# Patient Record
Sex: MALE | Race: WHITE
[De-identification: names, ages, dates, MRNs, and addresses within clinical notes are randomized per-mention and may not be internally consistent; named-entity substitution may affect disease eponyms.]

---

## 2017-05-16 ENCOUNTER — HOSPITAL ENCOUNTER (OUTPATIENT)
Dept: HOSPITAL 45 - C.LABBC | Age: 67
Discharge: HOME | End: 2017-05-16
Attending: UROLOGY
Payer: COMMERCIAL

## 2017-05-16 DIAGNOSIS — R97.20: Primary | ICD-10-CM

## 2017-06-08 ENCOUNTER — HOSPITAL ENCOUNTER (OUTPATIENT)
Dept: HOSPITAL 45 - C.LABBC | Age: 67
Discharge: HOME | End: 2017-06-08
Attending: UROLOGY
Payer: COMMERCIAL

## 2017-06-08 DIAGNOSIS — E11.9: ICD-10-CM

## 2017-06-08 DIAGNOSIS — E78.00: ICD-10-CM

## 2017-06-08 DIAGNOSIS — N41.1: Primary | ICD-10-CM

## 2017-06-08 LAB
ANION GAP SERPL CALC-SCNC: 7 MMOL/L (ref 3–11)
BUN SERPL-MCNC: 13 MG/DL (ref 7–18)
BUN/CREAT SERPL: 9.4 (ref 10–20)
CALCIUM SERPL-MCNC: 8.8 MG/DL (ref 8.5–10.1)
CHLORIDE SERPL-SCNC: 106 MMOL/L (ref 98–107)
CO2 SERPL-SCNC: 28 MMOL/L (ref 21–32)
CREAT SERPL-MCNC: 1.4 MG/DL (ref 0.6–1.4)
GLUCOSE SERPL-MCNC: 156 MG/DL (ref 70–99)
POTASSIUM SERPL-SCNC: 4.4 MMOL/L (ref 3.5–5.1)
SODIUM SERPL-SCNC: 141 MMOL/L (ref 136–145)

## 2017-07-18 ENCOUNTER — HOSPITAL ENCOUNTER (OUTPATIENT)
Dept: HOSPITAL 45 - C.LABBC | Age: 67
Discharge: HOME | End: 2017-07-18
Attending: INTERNAL MEDICINE
Payer: COMMERCIAL

## 2017-07-18 DIAGNOSIS — E78.00: Primary | ICD-10-CM

## 2017-07-18 DIAGNOSIS — E53.8: ICD-10-CM

## 2017-07-18 DIAGNOSIS — E11.9: ICD-10-CM

## 2017-07-18 LAB
ALT SERPL-CCNC: 28 U/L (ref 12–78)
ANION GAP SERPL CALC-SCNC: 5 MMOL/L (ref 3–11)
AST SERPL-CCNC: 15 U/L (ref 15–37)
BUN SERPL-MCNC: 12 MG/DL (ref 7–18)
BUN/CREAT SERPL: 10.6 (ref 10–20)
CALCIUM SERPL-MCNC: 8.9 MG/DL (ref 8.5–10.1)
CHLORIDE SERPL-SCNC: 107 MMOL/L (ref 98–107)
CHOLEST/HDLC SERPL: 4.2 {RATIO}
CO2 SERPL-SCNC: 29 MMOL/L (ref 21–32)
CREAT SERPL-MCNC: 1.1 MG/DL (ref 0.6–1.4)
GLUCOSE SERPL-MCNC: 124 MG/DL (ref 70–99)
GLUCOSE UR QL: 35 MG/DL
KETONES UR QL STRIP: 93 MG/DL
NITRITE UR QL STRIP: 99 MG/DL (ref 0–150)
PH UR: 148 MG/DL (ref 0–200)
POTASSIUM SERPL-SCNC: 4.1 MMOL/L (ref 3.5–5.1)
SODIUM SERPL-SCNC: 141 MMOL/L (ref 136–145)
VERY LOW DENSITY LIPOPROT CALC: 20 MG/DL

## 2017-07-19 LAB — EST. AVERAGE GLUCOSE BLD GHB EST-MCNC: 126 MG/DL

## 2017-07-26 NOTE — CODING QUERY MEDICAL NECESSITY
CQSUPPORTING DIAGNOSIS NEEDED





A supporting diagnosis is required for the test/procedure performed on this patient in 
order for us to be reimbursed by the patient's insurance. Please provide a supporting 
diagnosis for the following test/procedure listed below next to the test name along with 
your signature. 



*If there is no additional diagnosis for this patient that would support the following 
test/procedure please document that below next to the test/procedure.



Test(s)/Procedure(s) that require a supporting diagnosis:



DOS   07/18/17    VITAMIN D TEST







Provider Signature:  ______________________________  Date:  _______



Thank you  

Paige Farmer

Health Information Management

Phone:  [phone]

Fax:  [phone]



Once completed, please kindly fax back to [phone]



For questions please call [phone]

## 2017-08-02 ENCOUNTER — HOSPITAL ENCOUNTER (OUTPATIENT)
Dept: HOSPITAL 45 - C.LABBC | Age: 67
Discharge: HOME | End: 2017-08-02
Attending: UROLOGY
Payer: COMMERCIAL

## 2017-08-02 DIAGNOSIS — R97.20: Primary | ICD-10-CM

## 2017-08-02 LAB
PSA FREE MFR SERPL: 29.5 %
PSA FREE SERPL-MCNC: 2.25 NG/ML
PSA SERPL-MCNC: 7.62 NG/ML (ref 0–4)

## 2017-09-19 ENCOUNTER — HOSPITAL ENCOUNTER (OUTPATIENT)
Dept: HOSPITAL 45 - C.LABBC | Age: 67
Discharge: HOME | End: 2017-09-19
Attending: UROLOGY
Payer: COMMERCIAL

## 2017-09-19 DIAGNOSIS — R97.20: Primary | ICD-10-CM

## 2017-09-19 LAB
BUN SERPL-MCNC: 9 MG/DL (ref 7–18)
BUN/CREAT SERPL: 7.9 (ref 10–20)
CREAT SERPL-MCNC: 1.2 MG/DL (ref 0.6–1.4)

## 2017-09-21 ENCOUNTER — HOSPITAL ENCOUNTER (OUTPATIENT)
Dept: HOSPITAL 45 - C.MRIBC | Age: 67
Discharge: HOME | End: 2017-09-21
Attending: UROLOGY
Payer: COMMERCIAL

## 2017-09-21 DIAGNOSIS — R97.20: Primary | ICD-10-CM

## 2017-09-21 DIAGNOSIS — N40.0: ICD-10-CM

## 2017-09-22 NOTE — DIAGNOSTIC IMAGING REPORT
PROSTATE MRI COMBO



CLINICAL HISTORY: 67 years-old Male presenting with ELEVATED PSA. PSA 7.62

ng/mL. 



TECHNIQUE: Multisequence, multiplanar MR imaging of the prostate was performed

before and after the administration of intravenous contrast. IV contrast: 14 mL

of Gadavist. 



COMPARISON: None.



FINDINGS:



Prostate: The prostate measures 5.9 x 5.4 x 4.5 cm (calculated volume 75 mL).

Moderate changes of benign prostatic hyperplasia. Precontrast T1 weighted

imaging demonstrates no evidence of intrinsic T1 hyperintensity to suggest

hemorrhage. Seminal vesicles normal. No suspicious lesion in the transition or

peripheral zones.



Bladder: Normal.



Bowel: Internal hemorrhoids suspected.



Lymph nodes: No lymphadenopathy in the visualized portion of the pelvis.



Vasculature: Iliac vessels patent.



Osseous structures: Normal bone marrow signal intensity. Fat-containing

bilateral inguinal hernias.



IMPRESSION:

1.  No evidence of a suspicious lesion within the prostate.



2.  Benign prostatic hyperplasia.







Electronically signed by:  Baljit Smith M.D.

9/22/2017 4:42 PM



Dictated Date/Time:  9/22/2017 4:34 PM

## 2018-01-18 ENCOUNTER — HOSPITAL ENCOUNTER (OUTPATIENT)
Dept: HOSPITAL 45 - C.LABBC | Age: 68
Discharge: HOME | End: 2018-01-18
Attending: INTERNAL MEDICINE
Payer: COMMERCIAL

## 2018-01-18 DIAGNOSIS — E11.9: ICD-10-CM

## 2018-01-18 DIAGNOSIS — I10: ICD-10-CM

## 2018-01-18 DIAGNOSIS — E78.00: Primary | ICD-10-CM

## 2018-01-18 LAB
BUN SERPL-MCNC: 14 MG/DL (ref 7–18)
CALCIUM SERPL-MCNC: 9.2 MG/DL (ref 8.5–10.1)
CO2 SERPL-SCNC: 28 MMOL/L (ref 21–32)
CREAT SERPL-MCNC: 1.16 MG/DL (ref 0.6–1.4)
GLUCOSE SERPL-MCNC: 135 MG/DL (ref 70–99)
KETONES UR QL STRIP: 85 MG/DL
PH UR: 143 MG/DL (ref 0–200)
POTASSIUM SERPL-SCNC: 4.2 MMOL/L (ref 3.5–5.1)
SODIUM SERPL-SCNC: 136 MMOL/L (ref 136–145)

## 2018-01-19 LAB — HBA1C MFR BLD: 5.9 % (ref 4.5–5.6)
